# Patient Record
Sex: MALE | Race: BLACK OR AFRICAN AMERICAN | Employment: UNEMPLOYED | URBAN - METROPOLITAN AREA
[De-identification: names, ages, dates, MRNs, and addresses within clinical notes are randomized per-mention and may not be internally consistent; named-entity substitution may affect disease eponyms.]

---

## 2019-08-08 ENCOUNTER — OFFICE VISIT (OUTPATIENT)
Dept: ORTHOPEDIC SURGERY | Age: 39
End: 2019-08-08

## 2019-08-08 VITALS
TEMPERATURE: 98.2 F | HEART RATE: 75 BPM | SYSTOLIC BLOOD PRESSURE: 113 MMHG | BODY MASS INDEX: 26.71 KG/M2 | HEIGHT: 71 IN | DIASTOLIC BLOOD PRESSURE: 90 MMHG | WEIGHT: 190.8 LBS | OXYGEN SATURATION: 99 %

## 2019-08-14 ENCOUNTER — OFFICE VISIT (OUTPATIENT)
Dept: ORTHOPEDIC SURGERY | Age: 39
End: 2019-08-14

## 2019-08-14 VITALS
DIASTOLIC BLOOD PRESSURE: 89 MMHG | WEIGHT: 191 LBS | TEMPERATURE: 98.7 F | RESPIRATION RATE: 18 BRPM | BODY MASS INDEX: 27.35 KG/M2 | HEART RATE: 82 BPM | SYSTOLIC BLOOD PRESSURE: 125 MMHG | HEIGHT: 70 IN

## 2019-08-14 DIAGNOSIS — M54.12 CERVICAL RADICULOPATHY: Primary | ICD-10-CM

## 2019-08-14 RX ORDER — PREDNISONE 10 MG/1
TABLET ORAL
Qty: 21 TAB | Refills: 0 | Status: SHIPPED | OUTPATIENT
Start: 2019-08-14

## 2019-08-14 NOTE — PATIENT INSTRUCTIONS
Pinched Nerve in the Neck: Care Instructions  Your Care Instructions  A pinched nerve in the neck happens when a vertebra or disc in the upper part of your spine is damaged. This damage can happen because of an injury. Or it can just happen with age. The changes caused by the damage may put pressure on a nearby nerve root, pinching it. This causes symptoms such as sharp pain in your neck, shoulder, arm, hand, or back. You may also have tingling or numbness. Sometimes it makes your arm weaker. The symptoms are usually worse when you turn your head or strain your neck. For many people, the symptoms get better over time and finally go away. Early treatment usually includes medicines for pain and swelling. Sometimes physical therapy and special exercises may help. Follow-up care is a key part of your treatment and safety. Be sure to make and go to all appointments, and call your doctor if you are having problems. It's also a good idea to know your test results and keep a list of the medicines you take. How can you care for yourself at home? · Be safe with medicines. Read and follow all instructions on the label. ? If the doctor gave you a prescription medicine for pain, take it as prescribed. ? If you are not taking a prescription pain medicine, ask your doctor if you can take an over-the-counter medicine. · Try using a heating pad on a low or medium setting for 15 to 20 minutes every 2 or 3 hours. Try a warm shower in place of one session with the heating pad. You can also buy single-use heat wraps that last up to 8 hours. · You can also try an ice pack for 10 to 15 minutes every 2 to 3 hours. There isn't strong evidence that either heat or ice will help. But you can try them to see if they help you. · Don't spend too long in one position. Take short breaks to move around and change positions. · Wear a seat belt and shoulder harness when you are in a car.   · Sleep with a pillow under your head and neck that keeps your neck straight. · If you were given a neck brace (cervical collar) to limit neck motion, wear it as instructed for as many days as your doctor tells you to. Do not wear it longer than you were told to. Wearing a brace for too long can lead to neck stiffness and can weaken the neck muscles. · Follow your doctor's instructions for gentle neck-stretching exercises. · Do not smoke. Smoking can slow healing of your discs. If you need help quitting, talk to your doctor about stop-smoking programs and medicines. These can increase your chances of quitting for good. · Avoid strenuous work or exercise until your doctor says it is okay. When should you call for help? Call 911 anytime you think you may need emergency care. For example, call if:    · You are unable to move an arm or a leg at all.   [facility] your doctor now or seek immediate medical care if:    · You have new or worse symptoms in your arms, legs, chest, belly, or buttocks. Symptoms may include:  ? Numbness or tingling. ? Weakness. ? Pain.     · You lose bladder or bowel control.    Watch closely for changes in your health, and be sure to contact your doctor if:    · You are not getting better as expected. Where can you learn more? Go to http://jaki-maritza.info/. Enter F751 in the search box to learn more about \"Pinched Nerve in the Neck: Care Instructions. \"  Current as of: September 20, 2018  Content Version: 12.1  © 8611-7404 [employer]. Care instructions adapted under license by [employer] (which disclaims liability or warranty for this information). If you have questions about a medical condition or this instruction, always ask your healthcare professional. [place] [address] any warranty or liability for your use of this information. Learning About a Cervical Epidural Injection  What is a cervical epidural injection?     A cervical epidural injection is a shot of medicine in your neck. The injection goes into the area around the spinal cord in your neck. A doctor may give it to you to help with pain, tingling, or numbness in your neck, shoulder, or arm. This injection may have both a steroid, which reduces swelling and pain, and a local anesthetic, which numbs the nerves. Or it may only have a steroid. Some people get a series of these injections over weeks or months. How is a cervical epidural done? The doctor will use a tiny needle to numb the skin where you are getting the injection. After the skin is numb, your doctor will use a larger needle for the epidural injection. He or she may use X-ray or ultrasound to help guide the needle. You may feel some pressure. But you should not feel pain. It takes about 10 to 15 minutes to get this injection. You will probably go home about 20 to 30 minutes after you get it. You will need someone to drive you home. What can you expect after a cervical epidural?  If your injection included local anesthetic medicine, your neck, shoulder, arm, or hand may feel heavy or numb right after the shot. With a local anesthetic, your pain may be gone right away. But it may return after a few hours. This is because the steroid hasn't started working yet. Before the steroid starts to work, your neck, shoulder, or arm may be sore for a few days. These injections don't always work. When they do, it takes 1 to 5 days. The pain relief can last for several days to a few months or longer. Some people are dizzy or feel sick to their stomach after getting this shot. These symptoms usually don't last very long. You may want to do less than normal for a few days. But you may also be able to return to your daily routine. If your pain is better, you may be able to keep doing your normal activities or physical therapy. But try not to overdo it, even if your pain has improved a lot.  If your pain is only a little better or if it comes back, your doctor may want you to get another injection in a few weeks. If your pain has not changed, talk to your doctor about other treatment choices. Follow-up care is a key part of your treatment and safety. Be sure to make and go to all appointments, and call your doctor if you are having problems. It's also a good idea to know your test results and keep a list of the medicines you take. Where can you learn more? Go to http://jaki-maritza.info/. Enter L710 in the search box to learn more about \"Learning About a Cervical Epidural Injection. \"  Current as of: September 20, 2018  Content Version: 12.1  © 4132-1681 [employer], [employer]. Care instructions adapted under license by [employer] (which disclaims liability or warranty for this information). If you have questions about a medical condition or this instruction, always ask your healthcare professional. [address] any warranty or liability for your use of this information.

## 2019-08-21 ENCOUNTER — HOSPITAL ENCOUNTER (OUTPATIENT)
Dept: PHYSICAL THERAPY | Age: 39
Discharge: HOME | End: 2019-08-21
Payer: COMMERCIAL

## 2019-08-21 PROCEDURE — 97110 THERAPEUTIC EXERCISES: CPT

## 2019-08-21 PROCEDURE — 97140 MANUAL THERAPY 1/> REGIONS: CPT

## 2019-08-21 PROCEDURE — 97161 PT EVAL LOW COMPLEX 20 MIN: CPT

## 2019-08-21 NOTE — PROGRESS NOTES
PHYSICAL THERAPY - DAILY TREATMENT NOTE    Patient Name: Adolfo Bosworth        Date: 2019  : 1980   yes Patient  Verified  Visit #:   1     Insurance: Payor: [address] [place] [address] Road / Plan: [address] / Product Type: Managed Care Medicaid /      In time: 2:35 Out time: 3:10   Total Treatment Time: 35     Medicare/[facility] Time Tracking (below)   Total Timed Codes (min):  n/a 1:1 Treatment Time:  n/a     TREATMENT AREA =  Neck pain [M54.2]    SUBJECTIVE  Pain Level (on 0 to 10 scale):  0  / 10   Medication Changes/New allergies or changes in medical history, any new surgeries or procedures?    no  If yes, update Summary List   Subjective Functional Status/Changes:  []  No changes reported     See POC          OBJECTIVE    See exam on chart for details on objective findings          10 min Therapeutic Exercise:  [x]  See flow sheet   Rationale:      increase ROM and increase strength to improve the patients ability to tolerate gym activities     10 min Manual Therapy: C/s ret; c/s ret->ext;  C/s ret/ext/wiggle. C/s traction. Rationale:      decrease pain, increase ROM and increase tissue extensibility to improve patient's ability to tolerate gym activities          Billed With/As:   [x] TE   [] TA   [] Neuro   [] Self Care Patient Education: [x] Review HEP    [] Progressed/Changed HEP based on:   [] positioning   [] body mechanics   [] transfers   [] heat/ice application    [x] other: ed on no OH lifting in gym. Pt advised may perform lat pull down with light/medium weight if no pain.      Other Objective/Functional Measures:    See POC     Post Treatment Pain Level (on 0 to 10) scale:   0  / 10     ASSESSMENT  Assessment/Changes in Function:     See POC     []  See Progress Note/Recertification   Patient will continue to benefit from skilled PT services to modify and progress therapeutic interventions, address functional mobility deficits, address ROM deficits, address strength deficits, analyze and address soft tissue restrictions, analyze and cue movement patterns, analyze and modify body mechanics/ergonomics and instruct in home and community integration to attain remaining goals. Progress toward goals / Updated goals:    See POC     PLAN  []  Upgrade activities as tolerated yes Continue plan of care   []  Discharge due to :    []  Other:      Therapist: Gilma Bertrand.  Galina Franklin, PT    Date: 8/21/2019 Time: 9:20 AM     Future Appointments   Date Time Provider Adilson Inman   8/28/2019  5:00 PM Morales London [facility]   9/4/2019  4:15 PM [place] Vladimir, PTA [facility]   9/6/2019  2:45 PM [place] Vladimir, PTA [facility]   9/9/2019  5:15 PM [place] Vladimir, PTA [facility]   9/11/2019  5:00 PM [place] Vladimir, PTA [facility]   9/16/2019  5:15 PM [place] Vladimir, PTA [facility]   9/18/2019  5:00 PM [place] Vladimir, PTA [facility]   9/23/2019  5:15 PM Path[place] Vladimir, PTA [facility]   9/25/2019  5:00 PM [place] Vladimir, PTA [facility]   9/30/2019  5:15 PM [place] Vladimir, PTA [facility]

## 2019-08-21 NOTE — PROGRESS NOTES
[address] [facility] AT [address], [address] [place] [address] Road  Phone: [phone]   [phone]  PLAN OF CARE / [address] [place] [address] PHYSICAL THERAPY SERVICES  Patient Name: Bhanu Syed : 1980   Medical   Diagnosis: Neck pain [M54.2] Treatment Diagnosis: Neck pain [M54.2]   Onset Date: 3 years     Referral Source: Danna Sandoval MD Start of Care [facility]): 2019   Prior Hospitalization: See medical history Provider #: [id number]   Prior Level of Function: Chronic limitations to heavy lifting/carrying due to neck pain   Comorbidities: Alcohol/tobacco use, arthritis   Medications: Verified on Patient Summary List   The Plan of Care and following information is based on the information from the initial evaluation.   ===========================================================================================  Assessment / key information:  Pt is a 44y.o. year old LHD male with subjective complaints of insidious onset of neck pain and RUE paresthesias that started 3 years ago. Pt did not seek medial treatment until recently due to \"hoping it would go away\". Pt had recent MRI (+) cervical canal stenosis, moderate spinal canal narrowing at C3-4 with left central disc protrusion, mild spinal canal narrowing at X2-3, C5-6, and C67; Severe R foraminal narrowing at C7-T1 2/2 disc protrusion. Denies red flags. Referred for outpt PT by ortho spine specialist. Current pain is rated as 0 to7/10. States he has constant numbness to R digits 4 & 5. Current functional limitations: lifting/carrying. FOTO score= 49/100 indicating 51% impairment to functional activities. Today's evaulation is significant for   1) OBSERVATION:   Significant FHP, increased t/s kyphosis, slumped seated posture. 2) ROM: Cervical grossly WNL all planes with pain c/o to extension. B/l UE AROM/PROM WNL.    3) STRENGTH:  b/l UE grossly 4+/5 to 5/5 with myotomes intact. Poor deep cervical neck flexor endurance= 15\". 4) Additional findings:    Palpation: unremarkable,  Special tests (-) c/s compression/distraction, Spurling's, ULTT's. Joint mobility WNL. Flexibility mild tightness to b/l UT/LS/scalenes. These findings are supportive for diagnosis of R cervical radiculopathy. Pt will be a good candidate for skilled PT to address these impairments and promote return to normal ADLs and functional mobility for improved quality of life. Pt was ed on limiting heavy weight activity in the gym especially with overhead motions.      ===========================================================================================  Eval Complexity: History MEDIUM  Complexity : 1-2 comorbidities / personal factors will impact the outcome/ POC ;  Examination  MEDIUM Complexity : 3 Standardized tests and measures addressing body structure, function, activity limitation and / or participation in recreation ; Presentation LOW Complexity : Stable, uncomplicated ;  Decision Making MEDIUM Complexity : FOTO score of 26-74; Overall Complexity LOW   Problem List: pain affecting function, decrease ROM, decrease strength, decrease ADL/ functional abilitiies and decrease activity tolerance   Treatment Plan may include any combination of the following: Therapeutic exercise, Therapeutic activities, Neuromuscular re-education, Physical agent/modality, Manual therapy, Patient education, Self Care training and Functional mobility training  Patient / Family readiness to learn indicated by: asking questions, trying to perform skills and interest  Persons(s) to be included in education: patient (P)  Barriers to Learning/Limitations: None  Measures taken, if barriers to learning:    Patient Goal (s): \"get sensation back to my fingers\"   Patient self reported health status: good  Rehabilitation Potential: good   Short Term Goals: To be accomplished in  2  weeks:  1.   Pt will be educated in appropriate HEP to decrease pain, increase ROM, increase strength and return pt to PLOF. 2.  Pt will report at least 25% improvement in frequency/intensity of right UE radicular symptoms with ADL's.  Long Term Goals: To be accomplished in  4-6  weeks:  1. Pt will improve FOTO score to >/= 62 to demo a significant improvement in functional activity tolerance. 2. Pt will achieve >/= +5 GROC in order to promote increased activity tolerance. 3. Pt will be independent with long term HEP for self management in preparation for d/c.  4. Pt will demonstrate deep cervical neck flexor strength >/= 25\" for increased cervical stability with lifting activities. Frequency / Duration:   Patient to be seen  2  times per week for 4-6  weeks:  Patient / Caregiver education and instruction: self care, activity modification and exercises  Therapist Signature: Mayito Glass, PT Date: 2/75/8286   Certification Period: n/a Time: 9:21 AM   ===========================================================================================  I certify that the above Physical Therapy Services are being furnished while the patient is under my care. I agree with the treatment plan and certify that this therapy is necessary. Physician Signature:        Date:       Time:     Please sign and return to [facility] or you may fax the signed copy to [phone]. Thank you.

## 2019-09-04 ENCOUNTER — HOSPITAL ENCOUNTER (OUTPATIENT)
Dept: PHYSICAL THERAPY | Age: 39
Discharge: HOME | End: 2019-09-04
Payer: COMMERCIAL

## 2019-09-04 PROCEDURE — 97110 THERAPEUTIC EXERCISES: CPT

## 2019-09-04 PROCEDURE — 97012 MECHANICAL TRACTION THERAPY: CPT

## 2019-09-04 NOTE — PROGRESS NOTES
PT DAILY TREATMENT NOTE     Patient Name: Krystal Macias  Date:2019  : 1980  [x]  Patient  Verified  Payor: [address] [place] [address] Road / Plan: [address] / Product Type: Managed Care Medicaid /    In time:4:15  Out time:5:14  Total Treatment Time (min): 59  Total Timed Codes (min): 54  1:1 Treatment Time (min):    Visit #: 2 of     Treatment Area: Neck pain [M54.2]    SUBJECTIVE  Pain Level (0-10 scale): 0  Any medication changes, allergies to medications, adverse drug reactions, diagnosis change, or new procedure performed?: [x] No    [] Yes (see summary sheet for update)  Subjective functional status/changes:   [] No changes reported  My neck feels pretty good today, my biggest problem is the numbness and tingling in my little 2 fingers.      OBJECTIVE  Modality rationale: decrease pain and increase tissue extensibility to improve the patients ability to improve functional abilities    Min Type Additional Details    [] Estim: []Att   []Unatt  []TENS instruct                 []IFC  []Premod []NMES                       []Other:  []w/US   []w/ice   []w/heat  Position:  Location:   10 [x]  Traction: [x] Cervical       []Lumbar                       [] Prone          [x]Supine                       [x]Intermittent   []Continuous Lbs:18/12 joint segment without guarding   [] before manual  [] after manual    []  Ultrasound: []Continuous   [] Pulsed                           []1MHz   []3MHz Location:  W/cm2:    []  Iontophoresis with dexamethasone         Location: [] Take home patch   [] In clinic    []  Ice     []  heat  []  Ice massage Position:  Location:    []  Vasopneumatic Device Pressure: [] lo [] med [] hi   Temp: [] lo [] med [] hi   [] Skin assessment post-treatment:  []intact []redness- no adverse reaction       []redness  adverse reaction:       49 min Therapeutic Exercise:  [x] See flow sheet :   Rationale: increase ROM and increase strength to improve the patients ability to improve functional abilities        min Patient Education: [x] Review HEP    [] Progressed/Changed HEP based on:   [] positioning   [] body mechanics   [] transfers   [] heat/ice application        Other Objective/Functional Measures:     Pain Level (0-10 scale) post treatment: 0    ASSESSMENT/Changes in Function: Pt tolerated all new therex well upon trial today with chief c/o isolated residual numbness/paresthesia in right 4th and 5th digits. Pt did report decreased intensity of numbness/parethesia after initial trial with mechanical cervical traction today. Will continue to progress/advance patient within current POC as tolerated with monitoring symptoms. Patient will continue to benefit from skilled PT services to modify and progress therapeutic interventions, address functional mobility deficits, address ROM deficits, address strength deficits, analyze and cue movement patterns, analyze and modify body mechanics/ergonomics and assess and modify postural abnormalities to attain remaining goals. []  See Plan of Care  []  See progress note/recertification  []  See Discharge Summary         Progress towards goals / Updated goals: · Short Term Goals: To be accomplished in  2  weeks:  1. Pt will be educated in appropriate HEP to decrease pain, increase ROM, increase strength and return pt to PLOF.    2. Pt will report at least 25% improvement in frequency/intensity of right UE radicular symptoms with ADL's.      · Long Term Goals: To be accomplished in  4-6  weeks:  1. Pt will improve FOTO score to >/= 62 to demo a significant improvement in functional activity tolerance. 2. Pt will achieve >/= +5 GROC in order to promote increased activity tolerance.   3. Pt will be independent with long term HEP for self management in preparation for d/c.  4. Pt will demonstrate deep cervical neck flexor strength >/= 25\" for increased cervical stability with lifting activities.       PLAN  [x]  Upgrade activities as tolerated     []  Continue plan of care  []  Update interventions per flow sheet       []  Discharge due to:_  []  Other:_      Christen Jules PTA 9/4/2019  4:34 PM      Future Appointments   Date Time Provider Adilson Inman   9/6/2019  2:45 PM Veronica Mora PTA [facility]   9/9/2019  5:15 PM Veronica Mora, PTA [facility]   9/11/2019  5:00 PM Veronica Mora, PTA [facility]   9/16/2019  5:15 PM Veronica Mora, PTA [facility]   9/18/2019  5:00 PM Veronica Mora, PTA [facility]   9/23/2019  5:15 PM Veronica Mora PTA [facility]   9/25/2019  5:00 PM Veronica Mora, PTA [facility]   9/30/2019  5:15 PM Veronica Mora, PTA [facility]

## 2019-09-06 ENCOUNTER — APPOINTMENT (OUTPATIENT)
Dept: PHYSICAL THERAPY | Age: 39
End: 2019-09-06
Payer: COMMERCIAL

## 2019-09-09 ENCOUNTER — HOSPITAL ENCOUNTER (OUTPATIENT)
Dept: PHYSICAL THERAPY | Age: 39
Discharge: HOME | End: 2019-09-09
Payer: COMMERCIAL

## 2019-09-09 PROCEDURE — 97110 THERAPEUTIC EXERCISES: CPT

## 2019-09-09 PROCEDURE — 97012 MECHANICAL TRACTION THERAPY: CPT

## 2019-09-09 NOTE — PROGRESS NOTES
PT DAILY TREATMENT NOTE     Patient Name: Phu Hopper  Date:2019  : 1980  [x]  Patient  Verified  Payor: [address] [place] [address] Road / Plan: [address] / Product Type: Managed Care Medicaid /    In time:5:15  Out time:6:10  Total Treatment Time (min): 55  Total Timed Codes (min): 50  1:1 Treatment Time (min):    Visit #: 3 of     Treatment Area: Neck pain [M54.2]    SUBJECTIVE  Pain Level (0-10 scale): 0  Any medication changes, allergies to medications, adverse drug reactions, diagnosis change, or new procedure performed?: [x] No    [] Yes (see summary sheet for update)  Subjective functional status/changes:   [] No changes reported  My fingers felt less numb for about 30 minutes after I left here from the traction before they pretty much went back to the same, but my neck felt better for a longer period of time.     OBJECTIVE  Modality rationale: decrease pain and increase tissue extensibility to improve the patients ability to improve functional abilities   Min Type Additional Details    [] Estim: []Att   []Unatt  []TENS instruct                 []IFC  []Premod []NMES                       []Other:  []w/US   []w/ice   []w/heat  Position:  Location:   10 [x]  Traction: [x] Cervical       []Lumbar                       [] Prone          [x]Supine                       [x]Intermittent   []Continuous Lbs:18/12 joint segment without guarding   [] before manual  [] after manual    []  Ultrasound: []Continuous   [] Pulsed                           []1MHz   []3MHz Location:  W/cm2:    []  Iontophoresis with dexamethasone         Location: [] Take home patch   [] In clinic    []  Ice     []  heat  []  Ice massage Position:  Location:    []  Vasopneumatic Device Pressure: [] lo [] med [] hi   Temp: [] lo [] med [] hi   [] Skin assessment post-treatment:  []intact []redness- no adverse reaction       []redness  adverse reaction:       45 min Therapeutic Exercise:  [x] See flow sheet :   Rationale: increase ROM and increase strength to improve the patients ability to improve functional abilities            min Patient Education: [x] Review HEP    [] Progressed/Changed HEP based on:   [] positioning   [] body mechanics   [] transfers   [] heat/ice application        Other Objective/Functional Measures:     Pain Level (0-10 scale) post treatment: 0    ASSESSMENT/Changes in Function: Pt presented with approximately 30 minutes of short term relief of right digits 4&5 numbness/paresthesia after initial trial with mechanical traction last treatment before symptoms returned to the same level. Pt was challenged with addition of prone stability ball scapular strengthening exercises as well as min to mod challenge with theraband lower walkouts today. Will continue to progress/advance patient within current POC as tolerated with monitoring symptoms. Patient will continue to benefit from skilled PT services to modify and progress therapeutic interventions, address functional mobility deficits, address ROM deficits, address strength deficits, analyze and address soft tissue restrictions, analyze and cue movement patterns, analyze and modify body mechanics/ergonomics and assess and modify postural abnormalities to attain remaining goals. []  See Plan of Care  []  See progress note/recertification  []  See Discharge Summary         Progress towards goals / Updated goals: · Short Term Goals: To be accomplished in  2  weeks:  1.  Pt will be educated in appropriate HEP to decrease pain, increase ROM, increase strength and return pt to PLOF:9/9/19:Met   2.  Pt will report at least 25% improvement in frequency/intensity of right UE radicular symptoms with ADL's.      · Long Term Goals: To be accomplished in  4-6  weeks:  1. Pt will improve FOTO score to >/= 62 to demo a significant improvement in functional activity tolerance.   2. Pt will achieve >/= +5 GROC in order to promote increased activity tolerance. 3. Pt will be independent with long term HEP for self management in preparation for d/c.  4. Pt will demonstrate deep cervical neck flexor strength >/= 25\" for increased cervical stability with lifting activities.     PLAN  [x]  Upgrade activities as tolerated     []  Continue plan of care  []  Update interventions per flow sheet       []  Discharge due to:_  []  Other:_      Christen Jules, INDER 9/9/2019  5:18 PM      Future Appointments   Date Time Provider Adilson Inman   9/11/2019  5:00 PM Veronica Mora PTA [facility]   9/16/2019  5:15 PM Veronica Mora PTA [facility]   9/18/2019  5:00 PM Veronica Mora PTA [facility]   9/23/2019  5:15 PM Veronica Mora PTA [facility]   9/25/2019  5:00 PM Veronica Mora PTA [facility]   9/30/2019  5:15 PM Veronica Mora, PTA [facility]

## 2019-09-11 ENCOUNTER — APPOINTMENT (OUTPATIENT)
Dept: PHYSICAL THERAPY | Age: 39
End: 2019-09-11
Payer: COMMERCIAL

## 2019-09-16 ENCOUNTER — HOSPITAL ENCOUNTER (OUTPATIENT)
Dept: PHYSICAL THERAPY | Age: 39
Discharge: HOME | End: 2019-09-16
Payer: COMMERCIAL

## 2019-09-16 PROCEDURE — 97140 MANUAL THERAPY 1/> REGIONS: CPT

## 2019-09-16 PROCEDURE — 97110 THERAPEUTIC EXERCISES: CPT

## 2019-09-16 PROCEDURE — 97012 MECHANICAL TRACTION THERAPY: CPT

## 2019-09-16 NOTE — PROGRESS NOTES
PT DAILY TREATMENT NOTE     Patient Name: Phu Hopper  Date:2019  : 1980  [x]  Patient  Verified  Payor: [address] [place] [address] Road / Plan: [address] / Product Type: Managed Care Medicaid /    In time:5:15  Out time:6:08  Total Treatment Time (min): 53  Total Timed Codes (min): 48  1:1 Treatment Time (min):    Visit #: 4 of     Treatment Area: Neck pain [M54.2]    SUBJECTIVE  Pain Level (0-10 scale): 0  Any medication changes, allergies to medications, adverse drug reactions, diagnosis change, or new procedure performed?: [x] No    [] Yes (see summary sheet for update)  Subjective functional status/changes:   [] No changes reported  The numbness is going away in my ring finger, but it's still pretty much the same in my pinky finger    OBJECTIVE  Modality rationale: decrease pain and increase tissue extensibility to improve the patients ability to improve functional abilities   Min Type Additional Details    [] Estim: []Att   []Unatt  []TENS instruct                 []IFC  []Premod []NMES                       []Other:  []w/US   []w/ice   []w/heat  Position:  Location:   10 [x]  Traction: [x] Cervical       []Lumbar                       [] Prone          [x]Supine                       [x]Intermittent   []Continuous Lbs:18/12 joint segment without guarding   [] before manual  [x] after manual    []  Ultrasound: []Continuous   [] Pulsed                           []1MHz   []3MHz Location:  W/cm2:    []  Iontophoresis with dexamethasone         Location: [] Take home patch   [] In clinic    []  Ice     []  heat  []  Ice massage Position:  Location:    []  Vasopneumatic Device Pressure: [] lo [] med [] hi   Temp: [] lo [] med [] hi   [] Skin assessment post-treatment:  []intact []redness- no adverse reaction       []redness  adverse reaction:       35 min Therapeutic Exercise:  [x] See flow sheet :   Rationale: increase ROM and increase strength to improve the patients ability to improve functional abilities     8 min Manual Therapy:  Supported supine active cervical retraction, cervical retraction in right side bending and cervical retraction with extension   Rationale: decrease pain, increase ROM and increase tissue extensibility to improve functional mobility            min Patient Education: [x] Review HEP    [] Progressed/Changed HEP based on:   [] positioning   [] body mechanics   [] transfers   [] heat/ice application        Other Objective/Functional Measures:     Pain Level (0-10 scale) post treatment: 0    ASSESSMENT/Changes in Function: Pt presented with minimal right 4th digit symptoms, but no change in 5th digit symptoms even after trial with re introducing manual intervention with guide multiplanar cervical retraction. Pt was able to increase to 1 lb dumb bells with prone stability ball scapular strengthening PRE's with moderate challenge as well today. Will continue to progress/advance patient within current POC as tolerated with monitoring symptoms. Patient will continue to benefit from skilled PT services to modify and progress therapeutic interventions, address functional mobility deficits, address ROM deficits, address strength deficits, analyze and address soft tissue restrictions, analyze and cue movement patterns, analyze and modify body mechanics/ergonomics and assess and modify postural abnormalities to attain remaining goals. []  See Plan of Care  []  See progress note/recertification  []  See Discharge Summary         Progress towards goals / Updated goals: · Short Term Goals: To be accomplished in  2  weeks:  1.  Pt will be educated in appropriate HEP to decrease pain, increase ROM, increase strength and return pt to PLOF:9/9/19:Met   2.  Pt will report at least 25% improvement in frequency/intensity of right UE radicular symptoms with ADL's.      · Long Term Goals: To be accomplished in  4-6  weeks:  1.  Pt will improve FOTO score to >/= 62 to demo a significant improvement in functional activity tolerance. 2. Pt will achieve >/= +5 GROC in order to promote increased activity tolerance. 3. Pt will be independent with long term HEP for self management in preparation for d/c.  4. Pt will demonstrate deep cervical neck flexor strength >/= 25\" for increased cervical stability with lifting activities.     PLAN  [x]  Upgrade activities as tolerated     []  Continue plan of care  []  Update interventions per flow sheet       []  Discharge due to:_  []  Other:_      Cuca Hector PTA 9/16/2019  5:41 PM      Future Appointments   Date Time Provider Adilson Inman   9/18/2019  5:00 PM Samantha Lu [facility]   9/23/2019  5:15 PM Counts include 234 beds at the [facility]   9/25/2019  5:00 PM Counts include 234 beds at the [facility]   9/30/2019  5:15 PM Counts include 234 beds at the [facility]

## 2019-09-18 ENCOUNTER — APPOINTMENT (OUTPATIENT)
Dept: PHYSICAL THERAPY | Age: 39
End: 2019-09-18
Payer: COMMERCIAL

## 2019-09-23 ENCOUNTER — HOSPITAL ENCOUNTER (OUTPATIENT)
Dept: PHYSICAL THERAPY | Age: 39
Discharge: HOME | End: 2019-09-23
Payer: COMMERCIAL

## 2019-09-23 PROCEDURE — 97140 MANUAL THERAPY 1/> REGIONS: CPT

## 2019-09-23 PROCEDURE — 97110 THERAPEUTIC EXERCISES: CPT

## 2019-09-23 PROCEDURE — 97012 MECHANICAL TRACTION THERAPY: CPT

## 2019-09-23 NOTE — PROGRESS NOTES
[address] AT [address], [address] [place] [address]  Phone: [phone]   [phone]  PROGRESS NOTE  Patient Name: Albreto Orellana : 1980   Treatment/Medical Diagnosis: Neck pain [M54.2]   Referral Source: Frank Coker MD     Date of Initial Visit: 19 Attended Visits: 5 Missed Visits: 3     SUMMARY OF TREATMENT  Therapeutic exercise for cervical mobility, postural awareness/strengthening, cervicothoracic stabilization, manual intervention, mechanical cervical traction moist heat and HEP. CURRENT STATUS  Patient reports approximately 25% overall improvement from therapy since initial evaluation with no reported pain at anytime. Pt presents with chief continued c/o isolated residual right UE numbness/paresthesia in digit 4<5 over the past few visits. Pt did report some relief with decreasing intensity of these symptoms initially with the first 2 sessions with mechanical cervical traction, but only short term relief for approximately 30 minutes after each session until symptoms returned to the same level. Pt is making good progress with gaining cervical mobility with all cervical AROM WFL's pain free as well as advancing with cervicothoracic stabilization within current POC. Pt would benefit from continued therapy for 3-7 remaining visits left on current script to achieve maximum medical benefit/potential from current POC. Will continue to progress/advance patient within current POC as tolerated with monitoring symptoms. Goal/Measure of Progress:  · Short Term Goals: To be accomplished in  2  weeks:  1. Pt will be educated in appropriate HEP to decrease pain, increase ROM, increase strength and return pt to PLOF. Met    2.   Pt will report at least 25% improvement in frequency/intensity of right UE radicular symptoms with ADL's: Met, Pt reports approximately 25% improvement in frequency/intensity of right UE radicular symptoms with ADL's since initial evaluation     · Long Term Goals: To be accomplished in  4-6  weeks:  1. Pt will improve FOTO score to >/= 62 to demo a significant improvement in functional activity tolerance. Not Met, current Foto score=58/100 (49/100 at initial evaluation)  2. Pt will achieve >/= +5 GROC in order to promote increased activity tolerance. Not Met, current GROC score= +2  3. Pt will be independent with long term HEP for self management in preparation for d/c:Progressing, Pt reports compliance with current HEP approximately 2x/week, was encouraged to increase to at least 1x/day  4. Pt will demonstrate deep cervical neck flexor strength >/= 25\" for increased cervical stability with lifting activities. Met       New Goals to be achieved in __3-7__  treatments:  1. Pt will report at least 50% improvement in frequency/intensity of right UE radicular symptoms with ADL's  2. Pt will improve FOTO score to >/= 62 to demo a significant improvement in functional activity tolerance. 3.Pt will achieve >/= +5 GROC in order to promote increased activity tolerance. 4.Pt will be independent with long term HEP for self management in preparation for d/c:    RECOMMENDATIONS  Continue with current POC for 3-7 remaining visits left on current script with advancing as tolerated, then reassess for the need for continuation or discharge from therapy. If you have any questions/comments please contact us directly at [phone]. Thank you for allowing us to assist in the care of your patient. LPTA Signature: Radha Bradley PTA  Date: 9/23/2019   PT Signature: CARLITOS Gilman Time: 5:28 PM   NOTE TO PHYSICIAN:  PLEASE COMPLETE THE ORDERS BELOW AND FAX TO   [facility]: [phone].   If you are unable to process this request in 24 hours please contact our office: [phone].    ___ I have read the above report and request that my patient continue as recommended.   ___ I have read the above report and request that my patient continue therapy with the following changes/special instructions:_________________________________________________________   ___ I have read the above report and request that my patient be discharged from therapy.      Physician Signature:        Date:       Time:

## 2019-09-23 NOTE — PROGRESS NOTES
PT DAILY TREATMENT NOTE     Patient Name: Shelley Spicer  Date:2019  : 1980  [x]  Patient  Verified  Payor: [address] [place] [address] Road / Plan: [address] / Product Type: Managed Care Medicaid /    In time:5:07  Out time:6:08  Total Treatment Time (min): 61  Total Timed Codes (min): 56  1:1 Treatment Time (min):    Visit #: 5 of     Treatment Area: Neck pain [M54.2]    SUBJECTIVE  Pain Level (0-10 scale): 0  Any medication changes, allergies to medications, adverse drug reactions, diagnosis change, or new procedure performed?: [x] No    [] Yes (see summary sheet for update)  Subjective functional status/changes:   [] No changes reported  My neck has been feeling somewhat better, but the numbness and tingling is still about the same.     OBJECTIVE  Modality rationale: decrease pain and increase tissue extensibility to improve the patients ability to improve functional abilities   Min Type Additional Details    [] Estim: []Att   []Unatt  []TENS instruct                 []IFC  []Premod []NMES                       []Other:  []w/US   []w/ice   []w/heat  Position:  Location:   10 [x]  Traction: [x] Cervical       []Lumbar                       [] Prone          [x]Supine                       [x]Intermittent   []Continuous Lbs:22/18 joint segment without guarding   [] before manual  [] after manual    []  Ultrasound: []Continuous   [] Pulsed                           []1MHz   []3MHz Location:  W/cm2:    []  Iontophoresis with dexamethasone         Location: [] Take home patch   [] In clinic    []  Ice     []  heat  []  Ice massage Position:  Location:    []  Vasopneumatic Device Pressure: [] lo [] med [] hi   Temp: [] lo [] med [] hi   [] Skin assessment post-treatment:  []intact []redness- no adverse reaction       []redness  adverse reaction:       51 min Therapeutic Exercise:  [x] See flow sheet :   Rationale: increase ROM and increase strength to improve the patients ability to improve functional abilities           min Patient Education: [x] Review HEP    [] Progressed/Changed HEP based on:   [] positioning   [] body mechanics   [] transfers   [] heat/ice application        Other Objective/Functional Measures:     Pain Level (0-10 scale) post treatment: 0    ASSESSMENT/Changes in Function:     Patient will continue to benefit from skilled PT services to modify and progress therapeutic interventions, address functional mobility deficits, address ROM deficits, address strength deficits, analyze and address soft tissue restrictions, analyze and cue movement patterns, analyze and modify body mechanics/ergonomics and assess and modify postural abnormalities to attain remaining goals. []  See Plan of Care  [x]  See progress note/recertification  []  See Discharge Summary         Progress towards goals / Updated goals:  See Progress note/Physician update for full detailed progress towards established goals.     PLAN  [x]  Upgrade activities as tolerated     []  Continue plan of care  []  Update interventions per flow sheet       []  Discharge due to:_  []  Other:_      Nahomy Burnett PTA 9/23/2019  5:23 PM      Future Appointments   Date Time Provider Adilson Inman   9/25/2019  5:00 PM Conor Ward [facility]   9/30/2019  5:15 PM Carlos Enrique Smith PTA [facility]

## 2019-09-25 ENCOUNTER — APPOINTMENT (OUTPATIENT)
Dept: PHYSICAL THERAPY | Age: 39
End: 2019-09-25
Payer: COMMERCIAL

## 2019-09-30 ENCOUNTER — HOSPITAL ENCOUNTER (OUTPATIENT)
Dept: PHYSICAL THERAPY | Age: 39
Discharge: HOME | End: 2019-09-30
Payer: COMMERCIAL

## 2019-09-30 PROCEDURE — 97012 MECHANICAL TRACTION THERAPY: CPT

## 2019-09-30 PROCEDURE — 97110 THERAPEUTIC EXERCISES: CPT

## 2019-09-30 NOTE — PROGRESS NOTES
PT DAILY TREATMENT NOTE     Patient Name: Sudarshan Powers  Date:2019  : 1980  [x]  Patient  Verified  Payor: [address] [place] [address] Road / Plan: [address] / Product Type: Managed Care Medicaid /    In time:5:07  Out time:6:08  Total Treatment Time (min): 61  Total Timed Codes (min): 56  1:1 Treatment Time (min):    Visit #: 6 of     Treatment Area: Neck pain [M54.2]    SUBJECTIVE  Pain Level (0-10 scale): 0  Any medication changes, allergies to medications, adverse drug reactions, diagnosis change, or new procedure performed?: [x] No    [] Yes (see summary sheet for update)  Subjective functional status/changes:   [] No changes reported  I don't have any pain in my neck, but my 2 fingers, especially my pinky finger is about the same.      OBJECTIVE  Modality rationale: decrease pain and increase tissue extensibility to improve the patients ability to improve functional abilities   Min Type Additional Details    [] Estim: []Att   []Unatt  []TENS instruct                 []IFC  []Premod []NMES                       []Other:  []w/US   []w/ice   []w/heat  Position:  Location:   10 [x]  Traction: [x] Cervical       []Lumbar                       [] Prone          [x]Supine                       [x]Intermittent   []Continuous Lbs:22/18 joint segment without guarding   [] before manual  [] after manual    []  Ultrasound: []Continuous   [] Pulsed                           []1MHz   []3MHz Location:  W/cm2:    []  Iontophoresis with dexamethasone         Location: [] Take home patch   [] In clinic    []  Ice     []  heat  []  Ice massage Position:  Location:    []  Vasopneumatic Device Pressure: [] lo [] med [] hi   Temp: [] lo [] med [] hi   [] Skin assessment post-treatment:  []intact []redness- no adverse reaction       []redness  adverse reaction:       51 min Therapeutic Exercise:  [x] See flow sheet :   Rationale: increase ROM and increase strength to improve the patients ability to improve functional abilities           min Patient Education: [x] Review HEP    [] Progressed/Changed HEP based on:   [] positioning   [] body mechanics   [] transfers   [] heat/ice application        Other Objective/Functional Measures:     Pain Level (0-10 scale) post treatment: 0    ASSESSMENT/Changes in Function: Pt presents with no change in isolated residual numbness/paresthesia in right digits 4&5 even with increasing to maximum allowable tension with mechanical traction and addition of self corkscrew stretches today. Otherwise, Pt continues to make good progress with gaining cervical mobility as well as advancing with cervicothoracic stabilization within current POC. Will continue to progress/advance patient within current POC as tolerated with monitoring symptoms. Patient will continue to benefit from skilled PT services to modify and progress therapeutic interventions, address functional mobility deficits, address ROM deficits, address strength deficits, analyze and cue movement patterns, analyze and modify body mechanics/ergonomics and assess and modify postural abnormalities to attain remaining goals. []  See Plan of Care  []  See progress note/recertification  []  See Discharge Summary         Progress towards goals / Updated goals:  1. Pt will report at least 50% improvement in frequency/intensity of right UE radicular symptoms with ADL's  2. Pt will improve FOTO score to >/= 62 to demo a significant improvement in functional activity tolerance. 3.Pt will achieve >/= +5 GROC in order to promote increased activity tolerance. 4.Pt will be independent with long term HEP for self management in preparation for d/c:    PLAN  [x]  Upgrade activities as tolerated     []  Continue plan of care  []  Update interventions per flow sheet       []  Discharge due to:_  []  Other:_      Gaby Carpenter PTA 9/30/2019  5:24 PM      No future appointments.

## 2019-10-29 NOTE — PROGRESS NOTES
[facility]ERAP[facility]  [address], [place], [address] - Phone: [phone]  Fax: [phone]  DISCHARGE SUMMARY  Patient Name: Anita Berger : 1980   Treatment/Medical Diagnosis: Neck pain [M54.2]   Referral Source: Maninder Pugh MD     Date of Initial Visit: 19 Attended Visits: 6 Missed Visits: 4     SUMMARY OF TREATMENT  Therapeutic exercise for cervical mobility, postural awareness/strengthening, cervicothoracic stabilization, manual intervention, mechanical cervical traction moist heat and HEP. CURRENT STATUS  Pt was seen for 6 sessions of skilled PT to address Dx: Neck pain. Last formal assessment completed on 19. Pt attended 1 additional session afterwards, however failed to return for further therapy after 19. Pt will be d/c'd due to non-compliance with attendance/POC. Unable to perform final goal assessment due to unplanned d/c. Please refer to previous PN for objective measurements. RECOMMENDATIONS  Discontinue therapy due to lack of attendance or compliance. If you have any questions/comments please contact us directly at [phone]. Thank you for allowing us to assist in the care of your patient. Therapist Signature: Mo Glass PT Date: 10/29/19      Time: 6:42 PM   NOTE TO PHYSICIAN:  Your patient's insurance requires this discharge note be signed and returned. PLEASE COMPLETE THE ORDERS BELOW AND RETURN [facility]:  [facility] @ [phone]    ___ I have read the above report and request that my patient be discharged from therapy.      Physician Signature:        Date:       Time:

## 2023-04-19 NOTE — PROGRESS NOTES
[address], [address]  [place] [address] [address]  Phone: [phone]  Fax: [phone]        Malik Saenz  : 1980  PCP: Merrill Kilpatrick MD  2019    NEW PATIENT      HISTORY OF PRESENT ILLNESS  Kait Collins is a 44 y.o. male c/o neck pain radiating into his RUE in a C8 distribution to the little/pinky finger x 4 months. Pt notes that it has progressively improved, but he continues to have numbness in his fingers. On examination, he had a negative Spurling's sign on the R, but he notes he was unable to perform this movement previously. He previously also had difficulty sleeping due to pain. Pt notes that he has not had any treatment for these symptoms. Pt notes that one day when he was in the gym, he was performing shoulder shrugs, and then he began feeling the numbness/tingling in his RUE. Cervical MRI 19: Congenital cervical canal stenosis. Moderate spinal canal narrowing at C3-4 due to a disc osteophyte complex and superimposed left central disc protrusion. Mild spinal canal narrowing at C2-3, C5-6, and C6-7. Severe right foraminal narrowing at C7-T1 due to a right foraminal disc protrusion. No cord signal abnormality. He rates his pain as a 0/10 today. ASSESSMENT  His symptoms are likely related to the right C8 radiculopathy evidenced on his MRI. He had decreased sensation in a C8 distribution on the R. We discussed options of : oral steroids, neuromodulators, PT, cervical interlaminar injections, EMG    PLAN  1. 10 mg Prednisone dose pack. 2. Referral to PT with [facility] and cervical traction ([place])  3. He can call if he would like to proceed with any of the options we discussed today. Pt will f/u in 6 weeks or sooner if needed. Diagnoses and all orders for this visit:    1.  Cervical radiculopathy  -     REFERRAL TO PHYSICAL THERAPY  -     predniSONE (STERAPRED DS) 10 mg dose pack; See administration instruction per 10mg dose pack         CHIEF COMPLAINT  Alonzo Bradley is seen today in consultation at the request of Rose Faustin MD for complaints of neck pain radiating into RUE. PAST MEDICAL HISTORY   No past medical history on file. No past surgical history on file. MEDICATIONS        ALLERGIES  No Known Allergies       SOCIAL HISTORY    Social History     Socioeconomic History    Marital status: SINGLE     Spouse name: Not on file    Number of children: Not on file    Years of education: Not on file    Highest education level: Not on file   Tobacco Use    Smoking status: Current Every Day Smoker    Smokeless tobacco: Never Used   Other Topics Concern       FAMILY HISTORY  No family history on file. REVIEW OF SYSTEMS  Review of Systems   Musculoskeletal: Positive for neck pain. RUE paraesthesia         PHYSICAL EXAMINATION  Visit Vitals  /89   Pulse 82   Temp 98.7 °F (37.1 °C) (Oral)   Resp 18   Ht 5' 10\" (1.778 m)   Wt 191 lb (86.6 kg)   BMI 27.41 kg/m²         Pain Assessment  8/8/2019   Location of Pain Neck;Back;Arm;Hand   Location Modifiers Right   Severity of Pain 10   Quality of Pain Sharp   Duration of Pain A few hours   Frequency of Pain Intermittent         Constitutional:  Well developed, well nourished, in no acute distress. Psychiatric: Affect and mood are appropriate. HEENT: Normocephalic, atraumatic. Extraocular movements intact. Integumentary: No rashes or abrasions noted on exposed areas. Cardiovascular: Regular rate and rhythm. Pulmonary: Clear to auscultation bilaterally. SPINE/MUSCULOSKELETAL EXAM    Cervical spine:  Neck is midline. Normal muscle tone. No focal atrophy is noted. ROM pain free. Shoulder ROM intact. No tenderness to palpation. Negative Spurling's sign. Negative Tinel's sign. Negative Cummins's sign.                Decreased sensation to light touch in C8 distribution on the right    Lumbar spine:  No rash, ecchymosis, or gross obliquity. No fasciculations. No focal atrophy is noted. No pain with hip ROM. Full range of motion. No tenderness to palpation. No tenderness to palpation at the sciatic notch. SI joints non-tender. Trochanters non tender. Sensation in the bilateral legs grossly intact to light touch. MOTOR:      Biceps  Triceps Deltoids Wrist Ext Wrist Flex Hand Intrin   Right 5/5 5/5 5/5 5/5 5/5 5/5   Left 5/5 5/5 5/5 5/5 5/5 5/5             Hip Flex  Quads Hamstrings Ankle DF EHL Ankle PF   Right 5/5 5/5 5/5 5/5 5/5 5/5   Left 5/5 5/5 5/5 5/5 5/5 5/5     DTRs are 2+ biceps, triceps, brachioradialis, patella, and Achilles. Negative Straight Leg raise. Squat not tested. No difficulty with tandem gait. Ambulation without assistive device. FWB. RADIOGRAPHS  Cervical MRI  images taken on 6/14/19 personally reviewed with patient:  Vertebrae: Nonspecific straightening of the cervical lordosis. No spondylolisthesis. Vertebral body heights are maintained. Chronic degenerative endplate changes at T2-2 and extensively at C5-6. No suspicious marrow signal abnormality. Congenital canal stenosis throughout the cervical spine. Disc spaces: Disc space narrowing at C5-6. Craniocervical junction and posterior fossa: Unremarkable. Cervical spinal cord: Normal size and signal intensity. Soft tissues: Unremarkable. LEVELS:  C2-3: Small disc osteophyte complex. Bilateral uncovertebral joint hypertrophy. Mild spinal canal narrowing. No significant foraminal narrowing. C3-4: Small discussed effect complex with a superimposed left central disc protrusion effacing the ventral thecal sac, abutting and deforming the cord. Bilateral uncovertebral joint hypertrophy. Moderate spinal canal narrowing. Mild bilateral foraminal narrowing. C4-5: Small disc osteophyte complex. Bilateral uncovertebral joint hypertrophy, right greater than the left. Mild spinal canal narrowing.  Moderate right and mild left foraminal narrowing. C5-6: Broad disc osteophyte complex. Bilateral uncovertebral joint hypertrophy. Mild spinal canal narrowing. No significant foraminal narrowing. C6-7: Small disc osteophyte complex. Bilateral uncovertebral joint hypertrophy. Mild spinal canal narrowing. No significant foraminal narrowing. C7-T1: Small disc osteophyte complex. Right foraminal disc protrusion. No significant spinal canal narrowing. Severe right and mild left foraminal narrowing. IMPRESSION:  1. Congenital cervical canal stenosis. 2. Moderate spinal canal narrowing at C3-4 due to a disc osteophyte complex and superimposed left central disc protrusion. 3. Mild spinal canal narrowing at C2-3, C5-6, and C6-7.  4. Severe right foraminal narrowing at C7-T1 due to a right foraminal disc protrusion. 5. No cord signal abnormality.  reviewed    Mr. Destini Rodriguez has a reminder for a \"due or due soon\" health maintenance. I have asked that he contact his primary care provider for follow-up on this health maintenance. 16 minutes of face-to-face contact were spent with the patient during today's visit extensively discussing symptoms and treatment plan. All questions were answered. More than half of this visit today was spent on counseling. Written by Yash Parra, as dictated by Dr. Kaz Davison. I, Dr. Kaz Davison, confirm that all documentation is accurate. Hypercholesterolemia Monitoring: I explained this is common when taking isotretinoin. We will monitor closely.